# Patient Record
Sex: MALE | Race: WHITE | Employment: OTHER | URBAN - METROPOLITAN AREA
[De-identification: names, ages, dates, MRNs, and addresses within clinical notes are randomized per-mention and may not be internally consistent; named-entity substitution may affect disease eponyms.]

---

## 2024-06-12 PROBLEM — D69.2 SENILE PURPURA (HCC): Status: ACTIVE | Noted: 2024-06-12

## 2024-06-12 PROBLEM — C61 PROSTATE CANCER (HCC): Status: ACTIVE | Noted: 2024-06-12

## 2024-06-12 PROBLEM — D68.9 COAGULOPATHY (HCC): Status: ACTIVE | Noted: 2024-06-12

## 2024-06-12 PROBLEM — I73.9 PVD (PERIPHERAL VASCULAR DISEASE) (HCC): Status: ACTIVE | Noted: 2024-06-12

## 2024-09-02 ENCOUNTER — HOSPITAL ENCOUNTER (OUTPATIENT)
Facility: HOSPITAL | Age: 89
Setting detail: OBSERVATION
Discharge: HOME | End: 2024-09-03
Attending: EMERGENCY MEDICINE | Admitting: FAMILY MEDICINE
Payer: MEDICARE

## 2024-09-02 ENCOUNTER — APPOINTMENT (OUTPATIENT)
Facility: HOSPITAL | Age: 89
End: 2024-09-02
Payer: MEDICARE

## 2024-09-02 DIAGNOSIS — R06.02 SHORTNESS OF BREATH: ICD-10-CM

## 2024-09-02 DIAGNOSIS — R07.9 CHEST PAIN, UNSPECIFIED TYPE: Primary | ICD-10-CM

## 2024-09-02 DIAGNOSIS — I48.20 CHRONIC ATRIAL FIBRILLATION (HCC): ICD-10-CM

## 2024-09-02 DIAGNOSIS — I50.9 ACUTE CONGESTIVE HEART FAILURE, UNSPECIFIED HEART FAILURE TYPE (HCC): ICD-10-CM

## 2024-09-02 LAB
ALBUMIN SERPL-MCNC: 3.6 G/DL (ref 3.5–5)
ALBUMIN/GLOB SERPL: 1.2 (ref 1.1–2.2)
ALP SERPL-CCNC: 123 U/L (ref 45–117)
ALT SERPL-CCNC: 23 U/L (ref 12–78)
ANION GAP SERPL CALC-SCNC: 2 MMOL/L (ref 5–15)
APPEARANCE UR: CLEAR
AST SERPL-CCNC: 21 U/L (ref 15–37)
BACTERIA URNS QL MICRO: NEGATIVE /HPF
BASOPHILS # BLD: 0.1 K/UL (ref 0–0.1)
BASOPHILS NFR BLD: 1 % (ref 0–1)
BILIRUB SERPL-MCNC: 0.5 MG/DL (ref 0.2–1)
BILIRUB UR QL: NEGATIVE
BUN SERPL-MCNC: 27 MG/DL (ref 6–20)
BUN/CREAT SERPL: 23 (ref 12–20)
CALCIUM SERPL-MCNC: 9.8 MG/DL (ref 8.5–10.1)
CHLORIDE SERPL-SCNC: 111 MMOL/L (ref 97–108)
CO2 SERPL-SCNC: 27 MMOL/L (ref 21–32)
COLOR UR: NORMAL
COMMENT:: NORMAL
COMMENT:: NORMAL
CREAT SERPL-MCNC: 1.17 MG/DL (ref 0.7–1.3)
DIFFERENTIAL METHOD BLD: ABNORMAL
EOSINOPHIL # BLD: 0.1 K/UL (ref 0–0.4)
EOSINOPHIL NFR BLD: 1 % (ref 0–7)
EPITH CASTS URNS QL MICRO: NORMAL /LPF
ERYTHROCYTE [DISTWIDTH] IN BLOOD BY AUTOMATED COUNT: 13.7 % (ref 11.5–14.5)
GLOBULIN SER CALC-MCNC: 3.1 G/DL (ref 2–4)
GLUCOSE SERPL-MCNC: 114 MG/DL (ref 65–100)
GLUCOSE UR STRIP.AUTO-MCNC: NEGATIVE MG/DL
HCT VFR BLD AUTO: 35 % (ref 36.6–50.3)
HGB BLD-MCNC: 11.2 G/DL (ref 12.1–17)
HGB UR QL STRIP: NEGATIVE
HYALINE CASTS URNS QL MICRO: NORMAL /LPF (ref 0–5)
IMM GRANULOCYTES # BLD AUTO: 0 K/UL (ref 0–0.04)
IMM GRANULOCYTES NFR BLD AUTO: 0 % (ref 0–0.5)
KETONES UR QL STRIP.AUTO: NEGATIVE MG/DL
LEUKOCYTE ESTERASE UR QL STRIP.AUTO: NEGATIVE
LYMPHOCYTES # BLD: 0.8 K/UL (ref 0.8–3.5)
LYMPHOCYTES NFR BLD: 13 % (ref 12–49)
MCH RBC QN AUTO: 32.7 PG (ref 26–34)
MCHC RBC AUTO-ENTMCNC: 32 G/DL (ref 30–36.5)
MCV RBC AUTO: 102 FL (ref 80–99)
MONOCYTES # BLD: 0.6 K/UL (ref 0–1)
MONOCYTES NFR BLD: 9 % (ref 5–13)
NEUTS SEG # BLD: 4.6 K/UL (ref 1.8–8)
NEUTS SEG NFR BLD: 76 % (ref 32–75)
NITRITE UR QL STRIP.AUTO: NEGATIVE
NRBC # BLD: 0 K/UL (ref 0–0.01)
NRBC BLD-RTO: 0 PER 100 WBC
NT PRO BNP: 5209 PG/ML
PH UR STRIP: 5.5 (ref 5–8)
PLATELET # BLD AUTO: 139 K/UL (ref 150–400)
POTASSIUM SERPL-SCNC: 4 MMOL/L (ref 3.5–5.1)
PROT SERPL-MCNC: 6.7 G/DL (ref 6.4–8.2)
PROT UR STRIP-MCNC: NEGATIVE MG/DL
RBC # BLD AUTO: 3.43 M/UL (ref 4.1–5.7)
RBC #/AREA URNS HPF: NORMAL /HPF (ref 0–5)
RBC MORPH BLD: ABNORMAL
SODIUM SERPL-SCNC: 140 MMOL/L (ref 136–145)
SP GR UR REFRACTOMETRY: 1.01 (ref 1–1.03)
SPECIMEN HOLD: NORMAL
SPECIMEN HOLD: NORMAL
TROPONIN I SERPL HS-MCNC: 21 NG/L (ref 0–76)
URINE CULTURE IF INDICATED: NORMAL
UROBILINOGEN UR QL STRIP.AUTO: 0.2 EU/DL (ref 0.2–1)
WBC # BLD AUTO: 6.2 K/UL (ref 4.1–11.1)
WBC URNS QL MICRO: NORMAL /HPF (ref 0–4)

## 2024-09-02 PROCEDURE — 81001 URINALYSIS AUTO W/SCOPE: CPT

## 2024-09-02 PROCEDURE — 6360000002 HC RX W HCPCS: Performed by: INTERNAL MEDICINE

## 2024-09-02 PROCEDURE — 2580000003 HC RX 258: Performed by: FAMILY MEDICINE

## 2024-09-02 PROCEDURE — 83880 ASSAY OF NATRIURETIC PEPTIDE: CPT

## 2024-09-02 PROCEDURE — 36415 COLL VENOUS BLD VENIPUNCTURE: CPT

## 2024-09-02 PROCEDURE — 6370000000 HC RX 637 (ALT 250 FOR IP): Performed by: FAMILY MEDICINE

## 2024-09-02 PROCEDURE — 84484 ASSAY OF TROPONIN QUANT: CPT

## 2024-09-02 PROCEDURE — 80053 COMPREHEN METABOLIC PANEL: CPT

## 2024-09-02 PROCEDURE — 71046 X-RAY EXAM CHEST 2 VIEWS: CPT

## 2024-09-02 PROCEDURE — 99285 EMERGENCY DEPT VISIT HI MDM: CPT

## 2024-09-02 PROCEDURE — 6360000002 HC RX W HCPCS: Performed by: EMERGENCY MEDICINE

## 2024-09-02 PROCEDURE — 6370000000 HC RX 637 (ALT 250 FOR IP): Performed by: EMERGENCY MEDICINE

## 2024-09-02 PROCEDURE — 96374 THER/PROPH/DIAG INJ IV PUSH: CPT

## 2024-09-02 PROCEDURE — 2060000000 HC ICU INTERMEDIATE R&B

## 2024-09-02 PROCEDURE — 85025 COMPLETE CBC W/AUTO DIFF WBC: CPT

## 2024-09-02 RX ORDER — POTASSIUM CHLORIDE 7.45 MG/ML
10 INJECTION INTRAVENOUS PRN
Status: DISCONTINUED | OUTPATIENT
Start: 2024-09-02 | End: 2024-09-03 | Stop reason: HOSPADM

## 2024-09-02 RX ORDER — MAGNESIUM SULFATE IN WATER 40 MG/ML
2000 INJECTION, SOLUTION INTRAVENOUS PRN
Status: DISCONTINUED | OUTPATIENT
Start: 2024-09-02 | End: 2024-09-03 | Stop reason: HOSPADM

## 2024-09-02 RX ORDER — EPLERENONE 25 MG/1
25 TABLET, FILM COATED ORAL DAILY
Status: DISCONTINUED | OUTPATIENT
Start: 2024-09-03 | End: 2024-09-03 | Stop reason: HOSPADM

## 2024-09-02 RX ORDER — ONDANSETRON 2 MG/ML
4 INJECTION INTRAMUSCULAR; INTRAVENOUS EVERY 6 HOURS PRN
Status: DISCONTINUED | OUTPATIENT
Start: 2024-09-02 | End: 2024-09-03 | Stop reason: HOSPADM

## 2024-09-02 RX ORDER — ONDANSETRON 4 MG/1
4 TABLET, ORALLY DISINTEGRATING ORAL EVERY 8 HOURS PRN
Status: DISCONTINUED | OUTPATIENT
Start: 2024-09-02 | End: 2024-09-03 | Stop reason: HOSPADM

## 2024-09-02 RX ORDER — DIGOXIN 0.25 MG/ML
125 INJECTION INTRAMUSCULAR; INTRAVENOUS ONCE
Status: COMPLETED | OUTPATIENT
Start: 2024-09-02 | End: 2024-09-02

## 2024-09-02 RX ORDER — ACETAMINOPHEN 325 MG/1
650 TABLET ORAL EVERY 6 HOURS PRN
Status: DISCONTINUED | OUTPATIENT
Start: 2024-09-02 | End: 2024-09-03 | Stop reason: HOSPADM

## 2024-09-02 RX ORDER — ASPIRIN 325 MG
325 TABLET ORAL
Status: COMPLETED | OUTPATIENT
Start: 2024-09-02 | End: 2024-09-02

## 2024-09-02 RX ORDER — CLOPIDOGREL BISULFATE 75 MG/1
75 TABLET ORAL DAILY
Status: DISCONTINUED | OUTPATIENT
Start: 2024-09-03 | End: 2024-09-03 | Stop reason: HOSPADM

## 2024-09-02 RX ORDER — SODIUM CHLORIDE 0.9 % (FLUSH) 0.9 %
5-40 SYRINGE (ML) INJECTION PRN
Status: DISCONTINUED | OUTPATIENT
Start: 2024-09-02 | End: 2024-09-03 | Stop reason: HOSPADM

## 2024-09-02 RX ORDER — METOPROLOL SUCCINATE 25 MG/1
25 TABLET, EXTENDED RELEASE ORAL DAILY
Status: DISCONTINUED | OUTPATIENT
Start: 2024-09-02 | End: 2024-09-02

## 2024-09-02 RX ORDER — POLYETHYLENE GLYCOL 3350 17 G/17G
17 POWDER, FOR SOLUTION ORAL DAILY PRN
Status: DISCONTINUED | OUTPATIENT
Start: 2024-09-02 | End: 2024-09-03 | Stop reason: HOSPADM

## 2024-09-02 RX ORDER — FUROSEMIDE 10 MG/ML
40 INJECTION INTRAMUSCULAR; INTRAVENOUS ONCE
Status: COMPLETED | OUTPATIENT
Start: 2024-09-02 | End: 2024-09-02

## 2024-09-02 RX ORDER — FUROSEMIDE 10 MG/ML
40 INJECTION INTRAMUSCULAR; INTRAVENOUS 2 TIMES DAILY
Status: DISCONTINUED | OUTPATIENT
Start: 2024-09-03 | End: 2024-09-03

## 2024-09-02 RX ORDER — SODIUM CHLORIDE 0.9 % (FLUSH) 0.9 %
5-40 SYRINGE (ML) INJECTION EVERY 12 HOURS SCHEDULED
Status: DISCONTINUED | OUTPATIENT
Start: 2024-09-02 | End: 2024-09-03 | Stop reason: HOSPADM

## 2024-09-02 RX ORDER — SODIUM CHLORIDE 9 MG/ML
INJECTION, SOLUTION INTRAVENOUS PRN
Status: DISCONTINUED | OUTPATIENT
Start: 2024-09-02 | End: 2024-09-03 | Stop reason: HOSPADM

## 2024-09-02 RX ORDER — ROSUVASTATIN CALCIUM 10 MG/1
20 TABLET, COATED ORAL DAILY
Status: DISCONTINUED | OUTPATIENT
Start: 2024-09-03 | End: 2024-09-03 | Stop reason: HOSPADM

## 2024-09-02 RX ORDER — ACETAMINOPHEN 650 MG/1
650 SUPPOSITORY RECTAL EVERY 6 HOURS PRN
Status: DISCONTINUED | OUTPATIENT
Start: 2024-09-02 | End: 2024-09-03 | Stop reason: HOSPADM

## 2024-09-02 RX ORDER — POTASSIUM CHLORIDE 750 MG/1
40 TABLET, EXTENDED RELEASE ORAL PRN
Status: DISCONTINUED | OUTPATIENT
Start: 2024-09-02 | End: 2024-09-03 | Stop reason: HOSPADM

## 2024-09-02 RX ORDER — METOPROLOL SUCCINATE 25 MG/1
25 TABLET, EXTENDED RELEASE ORAL DAILY
Status: DISCONTINUED | OUTPATIENT
Start: 2024-09-03 | End: 2024-09-03 | Stop reason: HOSPADM

## 2024-09-02 RX ADMIN — SODIUM CHLORIDE, PRESERVATIVE FREE 10 ML: 5 INJECTION INTRAVENOUS at 21:16

## 2024-09-02 RX ADMIN — FUROSEMIDE 40 MG: 10 INJECTION, SOLUTION INTRAMUSCULAR; INTRAVENOUS at 17:02

## 2024-09-02 RX ADMIN — ASPIRIN 325 MG: 325 TABLET ORAL at 17:02

## 2024-09-02 RX ADMIN — SACUBITRIL AND VALSARTAN 1 TABLET: 24; 26 TABLET, FILM COATED ORAL at 21:16

## 2024-09-02 RX ADMIN — APIXABAN 5 MG: 5 TABLET, FILM COATED ORAL at 21:16

## 2024-09-02 RX ADMIN — DIGOXIN 125 MCG: 0.25 INJECTION INTRAMUSCULAR; INTRAVENOUS at 21:15

## 2024-09-02 ASSESSMENT — ENCOUNTER SYMPTOMS
WHEEZING: 0
ABDOMINAL PAIN: 0
VOMITING: 0
COUGH: 1
ANAL BLEEDING: 0
BLOOD IN STOOL: 0
ABDOMINAL DISTENTION: 0
SHORTNESS OF BREATH: 0
NAUSEA: 0
DIARRHEA: 0

## 2024-09-02 ASSESSMENT — PAIN - FUNCTIONAL ASSESSMENT
PAIN_FUNCTIONAL_ASSESSMENT: NONE - DENIES PAIN
PAIN_FUNCTIONAL_ASSESSMENT: 0-10
PAIN_FUNCTIONAL_ASSESSMENT: 0-10

## 2024-09-02 ASSESSMENT — PAIN SCALES - GENERAL
PAINLEVEL_OUTOF10: 0
PAINLEVEL_OUTOF10: 0

## 2024-09-02 ASSESSMENT — LIFESTYLE VARIABLES
HOW OFTEN DO YOU HAVE A DRINK CONTAINING ALCOHOL: MONTHLY OR LESS
HOW MANY STANDARD DRINKS CONTAINING ALCOHOL DO YOU HAVE ON A TYPICAL DAY: 1 OR 2

## 2024-09-02 NOTE — ED PROVIDER NOTES
signed)  Emergency Attending Physician     Seamus Serve Consult for Admission  12:04 PM    ED Room Number: 353/01  Patient Name and age:  Sudeep Trujillo 89 y.o.  male  Working Diagnosis:   1. Chest pain, unspecified type    2. Acute congestive heart failure, unspecified heart failure type (HCC)    3. Chronic atrial fibrillation (HCC)    4. Shortness of breath        Department: [facility] - [phone]  Recommended Level of Care: telemetry  Readmission: No  Code Status:  Full Code  Sepsis present:  No  Reassessment needed: Yes  Isolation Requirements: no  COVID-19 Suspicion: No    Other:           Urban Rosales MD  09/05/24 2241

## 2024-09-02 NOTE — H&P
History and Physical    Date of Service:  9/2/2024  Primary Care Provider: Nallely Marley PA-C  Source of information: patient, electronic medical record    Chief Complaint: Chest Pain      History of Presenting Illness:   Sudeep Trujillo is a 89 y.o. male with a pmhx a-fib, HFrEF, and CAD with MI and s/p PCI in 3/2024 s/p stent in [place], who presents with chest pain, and palpitations, and is being admitted for acute on chronic heart failure.    In the ED, VSS.  Labs showed  macrocytic anemia with Hgb 11.2, and probnp 5,209.  CXR with no acute cardiopulmonary process, and EKG showed   a-fib with PVC's.    In the ED, cardiology was consulted, and recommended diuresis with echocardiogram.     REVIEW OF SYSTEMS:  A comprehensive review of systems was negative except for that written in the History of Present Illness.     Past Medical History:   Diagnosis Date    A-fib (HCC)     CHF (congestive heart failure) (HCC)     Heart attack (HCC)     Prostate cancer (HCC)       Past Surgical History:   Procedure Laterality Date    CARDIAC DEFIBRILLATOR PLACEMENT      CATARACT EXTRACTION       Prior to Admission medications    Medication Sig Start Date End Date Taking? Authorizing Provider   famotidine (PEPCID) 20 MG tablet Take 1 tablet by mouth at bedtime    ProviderMolly MD   apixaban (ELIQUIS) 5 MG TABS tablet Take 1 tablet by mouth 2 times daily    Molly Salas MD   clopidogrel (PLAVIX) 75 MG tablet Take 1 tablet by mouth daily 3/15/24   Molly Salas MD   FARXIGA 10 MG tablet Take 1 tablet by mouth daily 3/26/24   Molly Salas MD   eplerenone (INSPRA) 25 MG tablet Take 1 tablet by mouth daily 3/20/24   Molly Salas MD   metoprolol succinate (TOPROL XL) 25 MG extended release tablet Take 1 tablet by mouth daily 5/21/24   Molly Salas MD   rosuvastatin (CRESTOR) 20 MG tablet Take 1 tablet by mouth daily 3/20/24   Molly Salas MD   ENTRESTO 24-26 MG

## 2024-09-02 NOTE — ED TRIAGE NOTES
Pt arrives ambulatory to triage vasquez c/o L sided \"heart palpitations\" that started 4-4.5 hours ago but resolved. Pt has extensive cardiac hx - and MI that occurred in March. Pt states he has no complaints at this time

## 2024-09-02 NOTE — ED NOTES
Bedside and Verbal shift change report given to BRANDON Benjamin (oncoming nurse) by BRANDON Dotson (offgoing nurse). Report included the following information Nurse Handoff Report, Index, ED Encounter Summary, ED SBAR, Adult Overview, MAR, and Recent Results.

## 2024-09-03 ENCOUNTER — APPOINTMENT (OUTPATIENT)
Facility: HOSPITAL | Age: 89
End: 2024-09-03
Attending: INTERNAL MEDICINE
Payer: MEDICARE

## 2024-09-03 VITALS
TEMPERATURE: 97.6 F | HEART RATE: 72 BPM | OXYGEN SATURATION: 95 % | DIASTOLIC BLOOD PRESSURE: 69 MMHG | HEIGHT: 67 IN | BODY MASS INDEX: 23.81 KG/M2 | WEIGHT: 151.68 LBS | RESPIRATION RATE: 22 BRPM | SYSTOLIC BLOOD PRESSURE: 122 MMHG

## 2024-09-03 LAB
ANION GAP SERPL CALC-SCNC: 3 MMOL/L (ref 5–15)
BASOPHILS # BLD: 0 K/UL (ref 0–0.1)
BASOPHILS NFR BLD: 1 % (ref 0–1)
BUN SERPL-MCNC: 27 MG/DL (ref 6–20)
BUN/CREAT SERPL: 23 (ref 12–20)
CALCIUM SERPL-MCNC: 9.7 MG/DL (ref 8.5–10.1)
CHLORIDE SERPL-SCNC: 114 MMOL/L (ref 97–108)
CO2 SERPL-SCNC: 25 MMOL/L (ref 21–32)
CREAT SERPL-MCNC: 1.19 MG/DL (ref 0.7–1.3)
DIFFERENTIAL METHOD BLD: ABNORMAL
ECHO AO ASC DIAM: 3.3 CM
ECHO AO ASCENDING AORTA INDEX: 1.83 CM/M2
ECHO AO ROOT DIAM: 3.7 CM
ECHO AO ROOT INDEX: 2.06 CM/M2
ECHO AV MEAN GRADIENT: 3 MMHG
ECHO AV MEAN VELOCITY: 0.8 M/S
ECHO AV PEAK GRADIENT: 5 MMHG
ECHO AV PEAK VELOCITY: 1.1 M/S
ECHO AV VELOCITY RATIO: 0.64
ECHO AV VTI: 19.9 CM
ECHO BSA: 1.84 M2
ECHO LA DIAMETER INDEX: 2.44 CM/M2
ECHO LA DIAMETER: 4.4 CM
ECHO LA TO AORTIC ROOT RATIO: 1.19
ECHO LA VOL A-L A2C: 171 ML (ref 18–58)
ECHO LA VOL A-L A4C: 141 ML (ref 18–58)
ECHO LA VOL MOD A2C: 165 ML (ref 18–58)
ECHO LA VOL MOD A4C: 136 ML (ref 18–58)
ECHO LA VOLUME AREA LENGTH: 165 ML
ECHO LA VOLUME INDEX A-L A2C: 95 ML/M2 (ref 16–34)
ECHO LA VOLUME INDEX A-L A4C: 78 ML/M2 (ref 16–34)
ECHO LA VOLUME INDEX AREA LENGTH: 92 ML/M2 (ref 16–34)
ECHO LA VOLUME INDEX MOD A2C: 92 ML/M2 (ref 16–34)
ECHO LA VOLUME INDEX MOD A4C: 76 ML/M2 (ref 16–34)
ECHO LV E' LATERAL VELOCITY: 5 CM/S
ECHO LV E' SEPTAL VELOCITY: 6 CM/S
ECHO LV EDV A2C: 179 ML
ECHO LV EDV A4C: 173 ML
ECHO LV EDV BP: 176 ML (ref 67–155)
ECHO LV EDV INDEX A4C: 96 ML/M2
ECHO LV EDV INDEX BP: 98 ML/M2
ECHO LV EDV NDEX A2C: 99 ML/M2
ECHO LV EF PHYSICIAN: 40 %
ECHO LV EJECTION FRACTION A2C: 45 %
ECHO LV EJECTION FRACTION A4C: 38 %
ECHO LV EJECTION FRACTION BIPLANE: 41 % (ref 55–100)
ECHO LV ESV A2C: 98 ML
ECHO LV ESV A4C: 108 ML
ECHO LV ESV BP: 103 ML (ref 22–58)
ECHO LV ESV INDEX A2C: 54 ML/M2
ECHO LV ESV INDEX A4C: 60 ML/M2
ECHO LV ESV INDEX BP: 57 ML/M2
ECHO LV FRACTIONAL SHORTENING: 22 % (ref 28–44)
ECHO LV INTERNAL DIMENSION DIASTOLE INDEX: 3.5 CM/M2
ECHO LV INTERNAL DIMENSION DIASTOLIC: 6.3 CM (ref 4.2–5.9)
ECHO LV INTERNAL DIMENSION SYSTOLIC INDEX: 2.72 CM/M2
ECHO LV INTERNAL DIMENSION SYSTOLIC: 4.9 CM
ECHO LV IVSD: 1.1 CM (ref 0.6–1)
ECHO LV MASS 2D: 303.5 G (ref 88–224)
ECHO LV MASS INDEX 2D: 168.6 G/M2 (ref 49–115)
ECHO LV POSTERIOR WALL DIASTOLIC: 1.1 CM (ref 0.6–1)
ECHO LV RELATIVE WALL THICKNESS RATIO: 0.35
ECHO LVOT AV VTI INDEX: 0.58
ECHO LVOT MEAN GRADIENT: 1 MMHG
ECHO LVOT PEAK GRADIENT: 2 MMHG
ECHO LVOT PEAK VELOCITY: 0.7 M/S
ECHO LVOT VTI: 11.5 CM
ECHO MV A VELOCITY: 0.01 M/S
ECHO MV AREA PHT: 3.5 CM2
ECHO MV E DECELERATION TIME (DT): 219.6 MS
ECHO MV E VELOCITY: 0.83 M/S
ECHO MV E/A RATIO: 83
ECHO MV E/E' LATERAL: 16.6
ECHO MV E/E' RATIO (AVERAGED): 15.22
ECHO MV E/E' SEPTAL: 13.83
ECHO MV LVOT VTI INDEX: 2.43
ECHO MV MAX VELOCITY: 1.1 M/S
ECHO MV MEAN GRADIENT: 1 MMHG
ECHO MV MEAN VELOCITY: 0.5 M/S
ECHO MV PEAK GRADIENT: 5 MMHG
ECHO MV PRESSURE HALF TIME (PHT): 63.7 MS
ECHO MV VTI: 28 CM
ECHO PV MAX VELOCITY: 0.7 M/S
ECHO PV PEAK GRADIENT: 2 MMHG
ECHO RV INTERNAL DIMENSION: 3.7 CM
ECHO RV TAPSE: 1.6 CM (ref 1.7–?)
ECHO TV REGURGITANT MAX VELOCITY: 2.35 M/S
ECHO TV REGURGITANT PEAK GRADIENT: 22 MMHG
EOSINOPHIL # BLD: 0.2 K/UL (ref 0–0.4)
EOSINOPHIL NFR BLD: 3 % (ref 0–7)
ERYTHROCYTE [DISTWIDTH] IN BLOOD BY AUTOMATED COUNT: 13.4 % (ref 11.5–14.5)
GLUCOSE SERPL-MCNC: 112 MG/DL (ref 65–100)
HCT VFR BLD AUTO: 33.8 % (ref 36.6–50.3)
HGB BLD-MCNC: 10.9 G/DL (ref 12.1–17)
IMM GRANULOCYTES # BLD AUTO: 0 K/UL (ref 0–0.04)
IMM GRANULOCYTES NFR BLD AUTO: 0 % (ref 0–0.5)
LYMPHOCYTES # BLD: 0.8 K/UL (ref 0.8–3.5)
LYMPHOCYTES NFR BLD: 16 % (ref 12–49)
MCH RBC QN AUTO: 32.1 PG (ref 26–34)
MCHC RBC AUTO-ENTMCNC: 32.2 G/DL (ref 30–36.5)
MCV RBC AUTO: 99.4 FL (ref 80–99)
MONOCYTES # BLD: 0.7 K/UL (ref 0–1)
MONOCYTES NFR BLD: 13 % (ref 5–13)
NEUTS SEG # BLD: 3.5 K/UL (ref 1.8–8)
NEUTS SEG NFR BLD: 67 % (ref 32–75)
NRBC # BLD: 0 K/UL (ref 0–0.01)
NRBC BLD-RTO: 0 PER 100 WBC
PLATELET # BLD AUTO: 161 K/UL (ref 150–400)
PMV BLD AUTO: 9 FL (ref 8.9–12.9)
POTASSIUM SERPL-SCNC: 3.7 MMOL/L (ref 3.5–5.1)
RBC # BLD AUTO: 3.4 M/UL (ref 4.1–5.7)
SODIUM SERPL-SCNC: 142 MMOL/L (ref 136–145)
WBC # BLD AUTO: 5.3 K/UL (ref 4.1–11.1)

## 2024-09-03 PROCEDURE — 6370000000 HC RX 637 (ALT 250 FOR IP): Performed by: INTERNAL MEDICINE

## 2024-09-03 PROCEDURE — 93308 TTE F-UP OR LMTD: CPT

## 2024-09-03 PROCEDURE — 80048 BASIC METABOLIC PNL TOTAL CA: CPT

## 2024-09-03 PROCEDURE — G0378 HOSPITAL OBSERVATION PER HR: HCPCS

## 2024-09-03 PROCEDURE — 6370000000 HC RX 637 (ALT 250 FOR IP): Performed by: FAMILY MEDICINE

## 2024-09-03 PROCEDURE — 2580000003 HC RX 258: Performed by: FAMILY MEDICINE

## 2024-09-03 PROCEDURE — 85025 COMPLETE CBC W/AUTO DIFF WBC: CPT

## 2024-09-03 PROCEDURE — 97165 OT EVAL LOW COMPLEX 30 MIN: CPT

## 2024-09-03 RX ORDER — FUROSEMIDE 40 MG
40 TABLET ORAL DAILY
Qty: 60 TABLET | Refills: 3 | Status: SHIPPED | OUTPATIENT
Start: 2024-09-04

## 2024-09-03 RX ORDER — FUROSEMIDE 40 MG
40 TABLET ORAL DAILY
Qty: 60 TABLET | Refills: 3 | Status: SHIPPED | OUTPATIENT
Start: 2024-09-04 | End: 2024-09-03

## 2024-09-03 RX ORDER — FUROSEMIDE 40 MG
40 TABLET ORAL DAILY
Status: DISCONTINUED | OUTPATIENT
Start: 2024-09-03 | End: 2024-09-03 | Stop reason: HOSPADM

## 2024-09-03 RX ADMIN — FUROSEMIDE 40 MG: 40 TABLET ORAL at 08:34

## 2024-09-03 RX ADMIN — APIXABAN 5 MG: 5 TABLET, FILM COATED ORAL at 08:34

## 2024-09-03 RX ADMIN — EPLERENONE 25 MG: 25 TABLET, FILM COATED ORAL at 08:34

## 2024-09-03 RX ADMIN — METOPROLOL SUCCINATE 25 MG: 25 TABLET, EXTENDED RELEASE ORAL at 08:34

## 2024-09-03 RX ADMIN — ROSUVASTATIN 20 MG: 10 TABLET, FILM COATED ORAL at 08:34

## 2024-09-03 RX ADMIN — EMPAGLIFLOZIN 10 MG: 10 TABLET, FILM COATED ORAL at 08:34

## 2024-09-03 RX ADMIN — SACUBITRIL AND VALSARTAN 1 TABLET: 24; 26 TABLET, FILM COATED ORAL at 08:34

## 2024-09-03 RX ADMIN — CLOPIDOGREL BISULFATE 75 MG: 75 TABLET ORAL at 08:34

## 2024-09-03 RX ADMIN — SODIUM CHLORIDE, PRESERVATIVE FREE 10 ML: 5 INJECTION INTRAVENOUS at 08:34

## 2024-09-03 NOTE — NURSE NAVIGATOR
HEART FAILURE NURSE NAVIGATOR NOTE  [facility]    Patient chart was reviewed by Heart Failure (HF) Nurse Navigators for compliance of prescribed treatment with guidelines directed medical therapy (GDMT) and HF database completed.     Please, review beneath recommendations for symptomatic patients with HF with Reduced Ejection Fraction ? 40% (HFrEF)* and patients whose LVEF improved > 40% (HFimpEF)* for your consideration when taking care of this patient.    Current Medical Therapy:    Name Sudeep RANDHAWA 1932   LVEF Pending; per Dr Dickye's note pt with previous EF 35-40%   NYHA Functional Class Class III on admission   ARNi/ACEi/ARB Entresto 24-26mg bid   Beta-blocker Toprol XL 25mg qd   Aldosterone Antagonist Eplerenone 25mg qd   SGLT2 inhibitor Jardiance 10mg qd   Hydralazine/Isosorbide Dinitrate    Consulting Cardiologist: [facility]     Recommendations:    Please, add the following GDMT for HFrEF ? 40% [Class 1] or document in discharge summary/progress note why patient cannot take the medication:  ARNi/ACEi or ARB  Beta-blockers (carvedilol, sustained-release metoprolol succinate or bisoprolol)  Aldosterone antagonists GFR > 30 and K< 5  SGLT2 inhibitor  Hydralazine/Isosorbide dinitrate for  Americans with Class III/IV symptoms  Adjust diuretic dose at discharge if hospitalized for volume overload    Consider adding the following GDMT for HFrEF ? 40%, if appropriate [Class 2b]:  Ivabradine for patients on maximally tolerated beta-blocker dose in order to achieve HR 70-80bpm  Digoxin, goal level 0.5-0.9  Polyunsaturated fatty acids  Vericuguat  For patient with hyperkalemia while on RAASi > 5.5, consider adding potassium binders (patiromer, sodium zirconium cycosilicate)    Note: the following medications may be potentially harmful in heart failure [Class 3]:  Calcium channel blockers (doxazosin, diltiazem, verapamil, nifedipine)  Antiarrhythmics (flecanide,

## 2024-09-03 NOTE — PLAN OF CARE
Problem: Safety - Adult  Goal: Free from fall injury  9/3/2024 1559 by Nohemi Ferreira RN  Outcome: Adequate for Discharge  9/3/2024 1013 by Nohemi Ferreira RN  Outcome: Progressing     Problem: Discharge Planning  Goal: Discharge to home or other facility with appropriate resources  9/3/2024 1559 by Nohemi Ferreira RN  Outcome: Adequate for Discharge  9/3/2024 1013 by Nohemi Ferreira RN  Outcome: Progressing     Problem: ABCDS Injury Assessment  Goal: Absence of physical injury  9/3/2024 1559 by Nohemi Ferreira RN  Outcome: Adequate for Discharge  9/3/2024 1013 by Nohemi Ferreira RN  Outcome: Progressing     Problem: Chronic Conditions and Co-morbidities  Goal: Patient's chronic conditions and co-morbidity symptoms are monitored and maintained or improved  9/3/2024 1559 by Nohemi Ferreira RN  Outcome: Adequate for Discharge  9/3/2024 1013 by Nohemi Ferreira RN  Outcome: Progressing

## 2024-09-03 NOTE — PLAN OF CARE
Problem: Safety - Adult  Goal: Free from fall injury  9/3/2024 1013 by Nohemi Ferreira RN  Outcome: Progressing  9/2/2024 2309 by Noiwan, [place], RN  Outcome: Progressing     Problem: Discharge Planning  Goal: Discharge to home or other facility with appropriate resources  9/3/2024 1013 by Nohemi Ferreira RN  Outcome: Progressing  9/2/2024 2309 by Noiwan, [place], RN  Outcome: Progressing     Problem: ABCDS Injury Assessment  Goal: Absence of physical injury  9/3/2024 1013 by Nohemi Ferreira RN  Outcome: Progressing  9/2/2024 2309 by Noiwan, [place], RN  Outcome: Progressing     Problem: Chronic Conditions and Co-morbidities  Goal: Patient's chronic conditions and co-morbidity symptoms are monitored and maintained or improved  Outcome: Progressing

## 2024-09-03 NOTE — DISCHARGE INSTRUCTIONS
To access the American [employer] Association's Interactive Workbook \"Healthier Living with Heart Failure - Managing Symptoms and Reducing Risk\"  Scan the QR code below.                 Discharge Instructions       PATIENT ID: Sudeep Trujillo  MRN: [id number]   YOB: 1932    DATE OF ADMISSION: [unfilled]    DATE OF DISCHARGE: 9/3/2024    PRIMARY CARE PROVIDER: @PCP@     ATTENDING PHYSICIAN: [unfilled]  DISCHARGING PROVIDER: Vin Storey MD    To contact this individual call [phone] and ask the  to page.   If unavailable ask to be transferred the Adult Hospitalist Department.    DISCHARGE DIAGNOSES Acute on chronic CHF NYHA II    CONSULTATIONS: Cardiology    PROCEDURES/SURGERIES: * No surgery found *    PENDING TEST RESULTS:   At the time of discharge the following test results are still pending: none    FOLLOW UP APPOINTMENTS:   PCP  Cardiology    ADDITIONAL CARE RECOMMENDATIONS:   Daily weight, if weight gain >2lbs in 3-4 days please call your cardiologist    DIET: cardiac diet    ACTIVITY: activity as tolerated        DISCHARGE MEDICATIONS:   See Medication Reconciliation Form    It is important that you take the medication exactly as they are prescribed.   Keep your medication in the bottles provided by the pharmacist and keep a list of the medication names, dosages, and times to be taken in your wallet.   Do not take other medications without consulting your doctor.       NOTIFY YOUR PHYSICIAN FOR ANY OF THE FOLLOWING:   Fever over 101 degrees for 24 hours.   Chest pain, shortness of breath, fever, chills, nausea, vomiting, diarrhea, change in mentation, falling, weakness, bleeding. Severe pain or pain not relieved by medications.  Or, any other signs or symptoms that you may have questions about.      DISPOSITION:   x Home With:   OT  PT  HH  RN       SNF/Inpatient Rehab/LTAC    Independent/assisted living    Hospice    Other:     CDMP Checked:   Yes x     PROBLEM LIST Updated:  Yes x

## 2024-09-03 NOTE — PROGRESS NOTES
Assumed care for patient  
Physical Therapy  PT consult received and chart reviewed.  Patient currently undergoing ECHO at bedside.  Will check back at later time.  Jolene Silver, PT, DPT    
Physical Therapy  PT consult received and chart reviewed.  Spoke with OT who states that patient is completely independent at this time and has no additional PT needs.  PT will signoff.  See OT note for additional details.  Jolene Silver, PT    
MPV 9.0 8.9 - 12.9 FL    Nucleated RBCs 0.0 0  WBC    nRBC 0.00 0.00 - 0.01 K/uL    Neutrophils % 67 32 - 75 %    Lymphocytes % 16 12 - 49 %    Monocytes % 13 5 - 13 %    Eosinophils % 3 0 - 7 %    Basophils % 1 0 - 1 %    Immature Granulocytes % 0 0.0 - 0.5 %    Neutrophils Absolute 3.5 1.8 - 8.0 K/UL    Lymphocytes Absolute 0.8 0.8 - 3.5 K/UL    Monocytes Absolute 0.7 0.0 - 1.0 K/UL    Eosinophils Absolute 0.2 0.0 - 0.4 K/UL    Basophils Absolute 0.0 0.0 - 0.1 K/UL    Immature Granulocytes Absolute 0.0 0.00 - 0.04 K/UL    Differential Type AUTOMATED           Assessment and Plan:    Acute on chronic systolic heart failure  Improving  Convert IV lasix to PO lasix 40mg daily  Continue Entresto, eplerenone, Jardiance, metoprolol  Repeat echo pending    Cardiomyopathy: baseline EF 35-40% based on echo from July 202    CAD: recent PCI of the LAD  Continue clopidogrel (no aspirin because he is on Eliquis)    Old MI (March 2024)    Chronic a.fib: rate controlled  Continue Eliquis, beta blocker, digoxin    Seems stable for discharge home  FU in 1-2 weeks      Signed:  Zeke Dickey MD  Interventional Cardiology  9/3/2024  
eval tasks   Based on the above components, the patient evaluation is determined to be of the following complexity level: Low

## 2024-09-03 NOTE — DISCHARGE SUMMARY
Discharge Summary       PATIENT ID: Sudeep Trujillo  MRN: [id number]   YOB: 1932    DATE OF ADMISSION: 9/2/2024  2:50 PM    DATE OF DISCHARGE: 9/3/2024   PRIMARY CARE PROVIDER: Nallely Marley PA-C     ATTENDING PHYSICIAN: Dr Vin Storey  DISCHARGING PROVIDER: Vin Storey MD    To contact this individual call [phone] and ask the  to page.  If unavailable ask to be transferred the Adult Hospitalist Department.    CONSULTATIONS: IP CONSULT TO CARDIOLOGY    PROCEDURES/SURGERIES: * No surgery found *    ADMITTING DIAGNOSES & HOSPITAL COURSE:   #acute on chronic systolic heart failure NYHA II  -probnp 5,209  -s/p IV lasix and now on oral, and digoxin  -strict I&O, and daily weight  -continue home jardiance, inspra, entresto, and metoprolol  -echo reviewed, no change in EF  -Appreciate Cardiology, cleared for discharge     #CAD  #chest pain  -continue home metoprolol, plavix, and crestor     #atrial fibrillation  -continue home eliquis        PENDING TEST RESULTS:   At the time of discharge the following test results are still pending: none    FOLLOW UP APPOINTMENTS:    PCP  Cardiology    ADDITIONAL CARE RECOMMENDATIONS:   Daily weight, if weight gain >2lbs in 3-4 days please call your cardiologist    DIET: cardiac diet    ACTIVITY: activity as tolerated      DISCHARGE MEDICATIONS:     Medication List        START taking these medications      furosemide 40 MG tablet  Commonly known as: LASIX  Take 1 tablet by mouth daily  Start taking on: September 4, 2024            CONTINUE taking these medications      ASTRAGALUS PO     calcium carbonate 500 MG Tabs tablet  Commonly known as: OSCAL     cholecalciferol 400 UNIT Tabs tablet  Commonly known as: VITAMIN D3     Citrucel 500 MG Tabs tablet  Generic drug: methylcellulose     clopidogrel 75 MG tablet  Commonly known as: PLAVIX     CO Q-10 PO     Eliquis 5 MG Tabs tablet  Generic drug: apixaban     Entresto 24-26 MG per tablet  Generic drug:

## 2024-09-03 NOTE — NURSE NAVIGATOR
Heart Failure Nurse Navigator rounds completed.    HF NN provided introduction to self and nurse navigator role. A[facility] Discharge Packet for Patients Diagnosed with Heart Failure booklet given to patient, along with weight calendar, signs/symptoms magnet, and card with QR codes for HF video resources.   Patient very familiar with HF diagnosis. States weighs self daily and confirms understanding about sodium restriction.  Anticipating discharge this afternoon/evening.  HFNN contacted [facility] to confirm post discharge follow up appt.  [facility]S updated with appt with Dr Dickey 9/9/24@ 145pm.    Patient provided with contact information for HF NN and encouraged to call with questions. Will continue to follow until discharge.        Time spent with patient discussing HF education:  15 min

## 2024-09-03 NOTE — CONSULTS
CARDIOLOGY CONSULT                  Subjective:    Date of  Admission:   Admission type:Emergency    Nallely Marley PA-C     This is a 89 yom wth CAD s/p PCI, HFrEF here with chest pain and likely CHF exacerbation. He  was in [place] and developed chest pressure which was different than his pain before stenting. He also had some SOB. Workup in the ED showed a normal troponin but an elevated BNP.    Patient Active Problem List   Diagnosis    Prostate cancer (HCC)    Senile purpura (HCC)    PVD (peripheral vascular disease) (HCC)    Coagulopathy (HCC)    Acute heart failure, unspecified heart failure type (HCC)        Past Medical History:   Diagnosis Date    A-fib (HCC)     CHF (congestive heart failure) (HCC)     Heart attack (HCC)     Prostate cancer (HCC)       Past Surgical History:   Procedure Laterality Date    CARDIAC DEFIBRILLATOR PLACEMENT      CATARACT EXTRACTION        Family History   Problem Relation Age of Onset    Prostate Cancer Other       Social History     Socioeconomic History    Marital status:      Spouse name: Not on file    Number of children: Not on file    Years of education: Not on file    Highest education level: Not on file   Occupational History    Not on file   Tobacco Use    Smoking status: Never    Smokeless tobacco: Never   Substance and Sexual Activity    Alcohol use: Not Currently    Drug use: Never    Sexual activity: Not on file   Other Topics Concern    Not on file   Social History Narrative    Not on file     Social Determinants of Health     Financial Resource Strain: Not on file   Food Insecurity: Not on file   Transportation Needs: Not on file   Physical Activity: Not on file   Stress: Not on file   Social Connections: Not on file   Intimate Partner Violence: Not on file   Housing Stability: Not on file        Current Facility-Administered Medications   Medication Dose Route Frequency    sodium chloride flush 0.9 % injection 5-40 mL  5-40 mL IntraVENous

## 2024-09-16 ENCOUNTER — HOSPITAL ENCOUNTER (EMERGENCY)
Facility: HOSPITAL | Age: 89
Discharge: HOME | End: 2024-09-16
Attending: EMERGENCY MEDICINE
Payer: MEDICARE

## 2024-09-16 VITALS
BODY MASS INDEX: 23.67 KG/M2 | DIASTOLIC BLOOD PRESSURE: 65 MMHG | HEART RATE: 78 BPM | HEIGHT: 67 IN | RESPIRATION RATE: 18 BRPM | WEIGHT: 150.79 LBS | OXYGEN SATURATION: 97 % | TEMPERATURE: 97.6 F | SYSTOLIC BLOOD PRESSURE: 115 MMHG

## 2024-09-16 DIAGNOSIS — K92.1 MELENA: Primary | ICD-10-CM

## 2024-09-16 DIAGNOSIS — N28.9 ACUTE RENAL INSUFFICIENCY: ICD-10-CM

## 2024-09-16 LAB
ALBUMIN SERPL-MCNC: 3.8 G/DL (ref 3.5–5)
ALBUMIN/GLOB SERPL: 1 (ref 1.1–2.2)
ALP SERPL-CCNC: 118 U/L (ref 45–117)
ALT SERPL-CCNC: 18 U/L (ref 12–78)
ANION GAP SERPL CALC-SCNC: 3 MMOL/L (ref 2–12)
AST SERPL-CCNC: 21 U/L (ref 15–37)
BASOPHILS # BLD: 0.1 K/UL (ref 0–0.1)
BASOPHILS NFR BLD: 1 % (ref 0–1)
BILIRUB SERPL-MCNC: 0.5 MG/DL (ref 0.2–1)
BUN SERPL-MCNC: 37 MG/DL (ref 6–20)
BUN/CREAT SERPL: 22 (ref 12–20)
CALCIUM SERPL-MCNC: 9.6 MG/DL (ref 8.5–10.1)
CHLORIDE SERPL-SCNC: 106 MMOL/L (ref 97–108)
CO2 SERPL-SCNC: 30 MMOL/L (ref 21–32)
COMMENT:: NORMAL
CREAT SERPL-MCNC: 1.67 MG/DL (ref 0.7–1.3)
DIFFERENTIAL METHOD BLD: ABNORMAL
EOSINOPHIL # BLD: 0.1 K/UL (ref 0–0.4)
EOSINOPHIL NFR BLD: 1 % (ref 0–7)
ERYTHROCYTE [DISTWIDTH] IN BLOOD BY AUTOMATED COUNT: 13 % (ref 11.5–14.5)
GLOBULIN SER CALC-MCNC: 3.8 G/DL (ref 2–4)
GLUCOSE SERPL-MCNC: 88 MG/DL (ref 65–100)
HCT VFR BLD AUTO: 39.3 % (ref 36.6–50.3)
HGB BLD-MCNC: 12.8 G/DL (ref 12.1–17)
IMM GRANULOCYTES # BLD AUTO: 0 K/UL (ref 0–0.04)
IMM GRANULOCYTES NFR BLD AUTO: 0 % (ref 0–0.5)
LYMPHOCYTES # BLD: 0.9 K/UL (ref 0.8–3.5)
LYMPHOCYTES NFR BLD: 13 % (ref 12–49)
MCH RBC QN AUTO: 32.5 PG (ref 26–34)
MCHC RBC AUTO-ENTMCNC: 32.6 G/DL (ref 30–36.5)
MCV RBC AUTO: 99.7 FL (ref 80–99)
MONOCYTES # BLD: 0.7 K/UL (ref 0–1)
MONOCYTES NFR BLD: 10 % (ref 5–13)
NEUTS SEG # BLD: 5.2 K/UL (ref 1.8–8)
NEUTS SEG NFR BLD: 75 % (ref 32–75)
NRBC # BLD: 0 K/UL (ref 0–0.01)
NRBC BLD-RTO: 0 PER 100 WBC
PLATELET # BLD AUTO: 263 K/UL (ref 150–400)
PMV BLD AUTO: 8.7 FL (ref 8.9–12.9)
POTASSIUM SERPL-SCNC: 4.1 MMOL/L (ref 3.5–5.1)
PROT SERPL-MCNC: 7.6 G/DL (ref 6.4–8.2)
RBC # BLD AUTO: 3.94 M/UL (ref 4.1–5.7)
SODIUM SERPL-SCNC: 139 MMOL/L (ref 136–145)
SPECIMEN HOLD: NORMAL
WBC # BLD AUTO: 7 K/UL (ref 4.1–11.1)

## 2024-09-16 PROCEDURE — 99283 EMERGENCY DEPT VISIT LOW MDM: CPT

## 2024-09-16 PROCEDURE — 36415 COLL VENOUS BLD VENIPUNCTURE: CPT

## 2024-09-16 PROCEDURE — 80053 COMPREHEN METABOLIC PANEL: CPT

## 2024-09-16 PROCEDURE — 85025 COMPLETE CBC W/AUTO DIFF WBC: CPT

## 2024-09-16 RX ORDER — FUROSEMIDE 20 MG
20 TABLET ORAL DAILY
Qty: 14 TABLET | Refills: 0 | Status: SHIPPED | OUTPATIENT
Start: 2024-09-16 | End: 2024-09-30

## 2024-09-16 ASSESSMENT — PAIN - FUNCTIONAL ASSESSMENT: PAIN_FUNCTIONAL_ASSESSMENT: NONE - DENIES PAIN

## 2024-09-19 ASSESSMENT — ENCOUNTER SYMPTOMS
CONSTIPATION: 0
SORE THROAT: 0

## 2024-10-02 ENCOUNTER — TELEPHONE (OUTPATIENT)
Age: 89
End: 2024-10-02

## 2024-10-02 NOTE — TELEPHONE ENCOUNTER
Np has a question in regards to a 3 word neurology test. When asking new patient to go more into details patient just repeated he would like to talk to doctor about a 3 word test.    Please contact

## 2025-02-15 ENCOUNTER — HOSPITAL ENCOUNTER (EMERGENCY)
Facility: HOSPITAL | Age: 89
Discharge: HOME | End: 2025-02-15
Attending: STUDENT IN AN ORGANIZED HEALTH CARE EDUCATION/TRAINING PROGRAM
Payer: MEDICARE

## 2025-02-15 ENCOUNTER — APPOINTMENT (OUTPATIENT)
Facility: HOSPITAL | Age: 89
End: 2025-02-15
Payer: MEDICARE

## 2025-02-15 VITALS
TEMPERATURE: 99.1 F | HEIGHT: 67 IN | HEART RATE: 91 BPM | DIASTOLIC BLOOD PRESSURE: 54 MMHG | SYSTOLIC BLOOD PRESSURE: 91 MMHG | BODY MASS INDEX: 24.22 KG/M2 | RESPIRATION RATE: 16 BRPM | OXYGEN SATURATION: 97 % | WEIGHT: 154.32 LBS

## 2025-02-15 DIAGNOSIS — J11.1 INFLUENZA: Primary | ICD-10-CM

## 2025-02-15 DIAGNOSIS — N30.00 ACUTE CYSTITIS WITHOUT HEMATURIA: ICD-10-CM

## 2025-02-15 LAB
ALBUMIN SERPL-MCNC: 3.4 G/DL (ref 3.5–5)
ALBUMIN/GLOB SERPL: 1 (ref 1.1–2.2)
ALP SERPL-CCNC: 77 U/L (ref 45–117)
ALT SERPL-CCNC: 24 U/L (ref 12–78)
ANION GAP SERPL CALC-SCNC: 9 MMOL/L (ref 2–12)
APPEARANCE UR: CLEAR
AST SERPL-CCNC: 25 U/L (ref 15–37)
BACTERIA URNS QL MICRO: ABNORMAL /HPF
BASOPHILS # BLD: 0.02 K/UL (ref 0–0.1)
BASOPHILS NFR BLD: 0.3 % (ref 0–1)
BILIRUB SERPL-MCNC: 0.4 MG/DL (ref 0.2–1)
BILIRUB UR QL CFM: NEGATIVE
BUN SERPL-MCNC: 37 MG/DL (ref 6–20)
BUN/CREAT SERPL: 27 (ref 12–20)
CALCIUM SERPL-MCNC: 9.3 MG/DL (ref 8.5–10.1)
CHLORIDE SERPL-SCNC: 104 MMOL/L (ref 97–108)
CO2 SERPL-SCNC: 23 MMOL/L (ref 21–32)
COLOR UR: ABNORMAL
COMMENT:: NORMAL
CREAT SERPL-MCNC: 1.38 MG/DL (ref 0.7–1.3)
DIFFERENTIAL METHOD BLD: ABNORMAL
EOSINOPHIL # BLD: 0.01 K/UL (ref 0–0.4)
EOSINOPHIL NFR BLD: 0.2 % (ref 0–7)
EPITH CASTS URNS QL MICRO: ABNORMAL /LPF
ERYTHROCYTE [DISTWIDTH] IN BLOOD BY AUTOMATED COUNT: 13.4 % (ref 11.5–14.5)
GLOBULIN SER CALC-MCNC: 3.4 G/DL (ref 2–4)
GLUCOSE SERPL-MCNC: 139 MG/DL (ref 65–100)
GLUCOSE UR STRIP.AUTO-MCNC: >1000 MG/DL
HCT VFR BLD AUTO: 37.7 % (ref 36.6–50.3)
HGB BLD-MCNC: 11.9 G/DL (ref 12.1–17)
HGB UR QL STRIP: ABNORMAL
HYALINE CASTS URNS QL MICRO: ABNORMAL /LPF (ref 0–5)
IMM GRANULOCYTES # BLD AUTO: 0.02 K/UL (ref 0–0.04)
IMM GRANULOCYTES NFR BLD AUTO: 0.3 % (ref 0–0.5)
KETONES UR QL STRIP.AUTO: ABNORMAL MG/DL
LEUKOCYTE ESTERASE UR QL STRIP.AUTO: NEGATIVE
LYMPHOCYTES # BLD: 0.55 K/UL (ref 0.8–3.5)
LYMPHOCYTES NFR BLD: 8.7 % (ref 12–49)
MCH RBC QN AUTO: 32.6 PG (ref 26–34)
MCHC RBC AUTO-ENTMCNC: 31.6 G/DL (ref 30–36.5)
MCV RBC AUTO: 103.3 FL (ref 80–99)
MONOCYTES # BLD: 0.62 K/UL (ref 0–1)
MONOCYTES NFR BLD: 9.8 % (ref 5–13)
NEUTS SEG # BLD: 5.08 K/UL (ref 1.8–8)
NEUTS SEG NFR BLD: 80.7 % (ref 32–75)
NITRITE UR QL STRIP.AUTO: POSITIVE
NRBC # BLD: 0 K/UL (ref 0–0.01)
NRBC BLD-RTO: 0 PER 100 WBC
PH UR STRIP: 5 (ref 5–8)
PLATELET # BLD AUTO: 141 K/UL (ref 150–400)
PMV BLD AUTO: 8.4 FL (ref 8.9–12.9)
POTASSIUM SERPL-SCNC: 3.8 MMOL/L (ref 3.5–5.1)
PROT SERPL-MCNC: 6.8 G/DL (ref 6.4–8.2)
PROT UR STRIP-MCNC: 30 MG/DL
RBC # BLD AUTO: 3.65 M/UL (ref 4.1–5.7)
RBC #/AREA URNS HPF: ABNORMAL /HPF (ref 0–5)
RBC MORPH BLD: ABNORMAL
RBC MORPH BLD: ABNORMAL
SODIUM SERPL-SCNC: 136 MMOL/L (ref 136–145)
SP GR UR REFRACTOMETRY: 1.03 (ref 1–1.03)
SPECIMEN HOLD: NORMAL
URINE CULTURE IF INDICATED: ABNORMAL
UROBILINOGEN UR QL STRIP.AUTO: 0.2 EU/DL (ref 0.2–1)
WBC # BLD AUTO: 6.3 K/UL (ref 4.1–11.1)
WBC URNS QL MICRO: ABNORMAL /HPF (ref 0–4)

## 2025-02-15 PROCEDURE — 71046 X-RAY EXAM CHEST 2 VIEWS: CPT

## 2025-02-15 PROCEDURE — 6370000000 HC RX 637 (ALT 250 FOR IP): Performed by: STUDENT IN AN ORGANIZED HEALTH CARE EDUCATION/TRAINING PROGRAM

## 2025-02-15 PROCEDURE — 87088 URINE BACTERIA CULTURE: CPT

## 2025-02-15 PROCEDURE — 81001 URINALYSIS AUTO W/SCOPE: CPT

## 2025-02-15 PROCEDURE — 85025 COMPLETE CBC W/AUTO DIFF WBC: CPT

## 2025-02-15 PROCEDURE — 87186 SC STD MICRODIL/AGAR DIL: CPT

## 2025-02-15 PROCEDURE — 80053 COMPREHEN METABOLIC PANEL: CPT

## 2025-02-15 PROCEDURE — 99284 EMERGENCY DEPT VISIT MOD MDM: CPT

## 2025-02-15 PROCEDURE — 87086 URINE CULTURE/COLONY COUNT: CPT

## 2025-02-15 PROCEDURE — 36415 COLL VENOUS BLD VENIPUNCTURE: CPT

## 2025-02-15 RX ORDER — BENZONATATE 100 MG/1
100 CAPSULE ORAL 3 TIMES DAILY PRN
Qty: 30 CAPSULE | Refills: 0 | Status: SHIPPED | OUTPATIENT
Start: 2025-02-15 | End: 2025-02-15

## 2025-02-15 RX ORDER — CEFUROXIME AXETIL 500 MG/1
500 TABLET ORAL 2 TIMES DAILY
Qty: 14 TABLET | Refills: 0 | Status: SHIPPED | OUTPATIENT
Start: 2025-02-15 | End: 2025-02-22

## 2025-02-15 RX ORDER — CEFUROXIME AXETIL 500 MG/1
500 TABLET ORAL 2 TIMES DAILY
Qty: 14 TABLET | Refills: 0 | Status: SHIPPED | OUTPATIENT
Start: 2025-02-15 | End: 2025-02-15

## 2025-02-15 RX ORDER — ACETAMINOPHEN 500 MG
1000 TABLET ORAL ONCE
Status: COMPLETED | OUTPATIENT
Start: 2025-02-15 | End: 2025-02-15

## 2025-02-15 RX ORDER — BENZONATATE 100 MG/1
100 CAPSULE ORAL 3 TIMES DAILY PRN
Qty: 30 CAPSULE | Refills: 0 | Status: SHIPPED | OUTPATIENT
Start: 2025-02-15 | End: 2025-02-25

## 2025-02-15 RX ADMIN — ACETAMINOPHEN 1000 MG: 500 TABLET ORAL at 13:26

## 2025-02-15 ASSESSMENT — PAIN DESCRIPTION - LOCATION: LOCATION: BACK

## 2025-02-15 ASSESSMENT — PAIN DESCRIPTION - ORIENTATION: ORIENTATION: UPPER

## 2025-02-15 ASSESSMENT — PAIN DESCRIPTION - PAIN TYPE: TYPE: ACUTE PAIN

## 2025-02-15 ASSESSMENT — PAIN DESCRIPTION - DESCRIPTORS: DESCRIPTORS: ACHING

## 2025-02-15 ASSESSMENT — PAIN SCALES - GENERAL: PAINLEVEL_OUTOF10: 8

## 2025-02-15 ASSESSMENT — PAIN DESCRIPTION - DIRECTION: RADIATING_TOWARDS: NECK AND UPPER BACK

## 2025-02-15 ASSESSMENT — PAIN DESCRIPTION - ONSET: ONSET: ON-GOING

## 2025-02-15 ASSESSMENT — PAIN DESCRIPTION - FREQUENCY: FREQUENCY: INTERMITTENT

## 2025-02-15 ASSESSMENT — PAIN - FUNCTIONAL ASSESSMENT: PAIN_FUNCTIONAL_ASSESSMENT: PREVENTS OR INTERFERES SOME ACTIVE ACTIVITIES AND ADLS

## 2025-02-15 NOTE — DISCHARGE INSTRUCTIONS
Continue to hydrate well orally while you are healing from the flu and the UTI. Take tylenol every 6-8 hours for fevers and body aches/muscle pains. Take antibiotics as prescribed for UTI.

## 2025-02-15 NOTE — ED TRIAGE NOTES
Pt c/o generalized body aches, chills, fevers, diarrhea and urinary incontinence. Denies injury/trauma. Seen 4 days ago and diagnosed with Flu.

## 2025-02-15 NOTE — ED PROVIDER NOTES
[facility] EMERGENCY DEPARTMENT  EMERGENCY DEPARTMENT ENCOUNTER      Pt Name: Sudeep Trujillo  MRN: [id number]  Birthdate 8/2/1932  Date of evaluation: 2/15/2025  Provider: Sarah Clark MD    CHIEF COMPLAINT       Chief Complaint   Patient presents with    Influenza     HISTORY OF PRESENT ILLNESS   (Location/Symptom, Timing/Onset, Context/Setting, Quality, Duration, Modifying Factors, Severity)  Note limiting factors.   HPI  89 yo M, with pmhx of afib, CHF, CAD, prostate, presents to the ER with daughter, for evaluation of several day history of generalized weakness, cough, myalgias, diarrhea, fevers, chills, and urinary incontinence. Daughter reports [facility], and diagnosed with influenza. Reports concern as patient is still not feeling any better. She is worried that he could be becoming dehydrated. Also worries that he may have developed superimposed pneumonia as patient has had a prior history of pneumonia. He denies shortness of breath or chest pain. Patient states he is concerned due to worsening urinary incontinence since becoming ill with influenza (does have some incontinence at baseline, but significantly worsened since becoming ill). Denies dysuria, frequency, urgency, or hematuria. No flank pain, abdominal pain, nausea, vomiting. Denies saddle anesthesia, back pain or back trauma. No lower extremity weakness or numbness.     Review of External Medical Records:     Nursing Notes were reviewed.    REVIEW OF SYSTEMS    (2-9 systems for level 4, 10 or more for level 5)     Review of Systems   All other systems reviewed and are negative.      Except as noted above the remainder of the review of systems was reviewed and negative.       PAST MEDICAL HISTORY     Past Medical History:   Diagnosis Date    A-fib (HCC)     CHF (congestive heart failure) (HCC)     Heart attack (HCC)     Prostate cancer (HCC)          SURGICAL HISTORY       Past Surgical History:  General: Skin is warm and dry.      Capillary Refill: Capillary refill takes less than 2 seconds.   Neurological:      General: No focal deficit present.      Mental Status: He is alert and oriented to person, place, and time.      GCS: GCS eye subscore is 4. GCS verbal subscore is 5. GCS motor subscore is 6.      Cranial Nerves: Cranial nerves 2-12 are intact.      Sensory: Sensation is intact.      Motor: Motor function is intact.      Coordination: Coordination is intact.      Gait: Gait is intact.         DIAGNOSTIC RESULTS     EKG: All EKG's are interpreted by the Emergency Department Physician who either signs or Co-signs this chart in the absence of a cardiologist.        RADIOLOGY:   Non-plain film images such as CT, Ultrasound and MRI are read by the radiologist. Plain radiographic images are visualized and preliminarily interpreted by the emergency physician with the below findings:        Interpretation per the Radiologist below, if available at the time of this note:    XR CHEST (2 VW)   Final Result      No acute process. The lungs are clear.      Electronically signed by Keith Mcconnell MD           LABS:  Labs Reviewed   CBC WITH AUTO DIFFERENTIAL - Abnormal; Notable for the following components:       Result Value    RBC 3.65 (*)     Hemoglobin 11.9 (*)     .3 (*)     Platelets 141 (*)     MPV 8.4 (*)     Neutrophils % 80.7 (*)     Lymphocytes % 8.7 (*)     Lymphocytes Absolute 0.55 (*)     All other components within normal limits   COMPREHENSIVE METABOLIC PANEL - Abnormal; Notable for the following components:    Glucose 139 (*)     BUN 37 (*)     Creatinine 1.38 (*)     BUN/Creatinine Ratio 27 (*)     Est, Glom Filt Rate 48 (*)     Albumin 3.4 (*)     Albumin/Globulin Ratio 1.0 (*)     All other components within normal limits   URINALYSIS WITH REFLEX TO CULTURE - Abnormal; Notable for the following components:    Protein, UA 30 (*)     Glucose, Ur >1000 (*)     Ketones, Urine TRACE (*)

## 2025-02-16 LAB
BACTERIA SPEC CULT: ABNORMAL
CC UR VC: ABNORMAL
SERVICE CMNT-IMP: ABNORMAL

## 2025-02-17 LAB
BACTERIA SPEC CULT: ABNORMAL
CC UR VC: ABNORMAL
SERVICE CMNT-IMP: ABNORMAL

## 2025-04-11 DIAGNOSIS — Z71.1 CONCERN ABOUT MEMORY: Primary | ICD-10-CM

## 2025-04-15 ENCOUNTER — OFFICE VISIT (OUTPATIENT)
Age: 89
End: 2025-04-15
Payer: MEDICARE

## 2025-04-15 VITALS
SYSTOLIC BLOOD PRESSURE: 130 MMHG | DIASTOLIC BLOOD PRESSURE: 68 MMHG | HEIGHT: 67 IN | HEART RATE: 78 BPM | BODY MASS INDEX: 23.23 KG/M2 | OXYGEN SATURATION: 97 % | WEIGHT: 148 LBS

## 2025-04-15 DIAGNOSIS — G31.84 MCI (MILD COGNITIVE IMPAIRMENT): Primary | ICD-10-CM

## 2025-04-15 PROCEDURE — 99204 OFFICE O/P NEW MOD 45 MIN: CPT | Performed by: PSYCHIATRY & NEUROLOGY

## 2025-04-15 PROCEDURE — 96136 PSYCL/NRPSYC TST PHY/QHP 1ST: CPT | Performed by: PSYCHIATRY & NEUROLOGY

## 2025-04-15 PROCEDURE — 96132 NRPSYC TST EVAL PHYS/QHP 1ST: CPT | Performed by: PSYCHIATRY & NEUROLOGY

## 2025-04-15 NOTE — PROGRESS NOTES
NEUROLOGY  NEW PATIENT EVALUATION/CONSULTATION       PATIENT NAME: Sudeep Trujillo    MRN: [id number]    REASON FOR CONSULTATION: Memory impairment    04/15/25      Previous records (physician notes, laboratory reports, and radiology reports) and imaging studies were reviewed and summarized. My recommendations will be communicated back to the patient's physician(s) via electronic medical record and/or by US mail. The patient was accompanied by his daughter.      HISTORY OF PRESENT ILLNESS:  Sudeep Trujillo is a 89 y.o.  male presenting for evaluation of memory impairment.      Onset and progression: 2 years with some progression     Neuropsychiatric symptoms    By Family members account:    Problems with judgment:Yes   Reduced interest in hobbies/activities: No   Repeats questions, stories, or statements: Yes    Trouble recalling people's names: Yes   Trouble learning how to use a tool or appliance: No   Forgetting the correct month or year: No   Difficulty handling financial affairs (bill-paying, taxes): Yes   Difficulty remembering appointments:Yes    Memory: Short term recall, forgetfulness  Language: denies word finding difficulty  Change in personality: denies  Socially inappropriate behavior: denies  Change in eating habits: loves sweets, no weight loss  Physical changes: None  Depressive symptoms: None  Hallucinations/Delusions: None    Ability to function:  Driving:Yes, no reported issues  Finances: Handled with assistance of his daughter  Cooking: Prepared by facility mostly  Manages own medication: Medications are pre-packaged AM/PM, takes these independently without difficulty   Residing: At home with his wife (ILF)    Prior work-up: None    Prior treatments: None      PAST MEDICAL HISTORY:  Past Medical History:   Diagnosis Date    A-fib (HCC)     CAD (coronary artery disease)     CHF (congestive heart failure) (HCC)     Heart attack (HCC)     Memory disorder     Prostate cancer (HCC)        PAST

## 2025-04-16 ENCOUNTER — RESULTS FOLLOW-UP (OUTPATIENT)
Age: 89
End: 2025-04-16

## 2025-04-16 LAB
TSH SERPL DL<=0.005 MIU/L-ACNC: 4.66 UIU/ML (ref 0.45–4.5)
VIT B12 SERPL-MCNC: 1029 PG/ML (ref 232–1245)